# Patient Record
Sex: FEMALE | Race: WHITE | Employment: FULL TIME | URBAN - METROPOLITAN AREA
[De-identification: names, ages, dates, MRNs, and addresses within clinical notes are randomized per-mention and may not be internally consistent; named-entity substitution may affect disease eponyms.]

---

## 2023-06-12 ENCOUNTER — TELEPHONE (OUTPATIENT)
Dept: PRIMARY CARE | Facility: CLINIC | Age: 63
End: 2023-06-12

## 2023-07-14 DIAGNOSIS — Z12.11 ENCOUNTER FOR SCREENING COLONOSCOPY: Primary | ICD-10-CM

## 2023-08-09 ENCOUNTER — OFFICE VISIT (OUTPATIENT)
Dept: PRIMARY CARE | Facility: CLINIC | Age: 63
End: 2023-08-09
Payer: COMMERCIAL

## 2023-08-09 VITALS
SYSTOLIC BLOOD PRESSURE: 104 MMHG | DIASTOLIC BLOOD PRESSURE: 70 MMHG | WEIGHT: 105.6 LBS | BODY MASS INDEX: 19.43 KG/M2 | HEIGHT: 62 IN | HEART RATE: 55 BPM

## 2023-08-09 DIAGNOSIS — Z00.00 ROUTINE GENERAL MEDICAL EXAMINATION AT A HEALTH CARE FACILITY: Primary | ICD-10-CM

## 2023-08-09 PROBLEM — B00.9 HSV-1 INFECTION: Status: RESOLVED | Noted: 2023-08-09 | Resolved: 2023-08-09

## 2023-08-09 PROBLEM — J32.9 CHRONIC SINUSITIS: Status: ACTIVE | Noted: 2023-08-09

## 2023-08-09 PROBLEM — J34.3 HYPERTROPHY OF BOTH INFERIOR NASAL TURBINATES: Status: ACTIVE | Noted: 2023-08-09

## 2023-08-09 PROBLEM — M81.0 POSTMENOPAUSAL OSTEOPOROSIS: Status: ACTIVE | Noted: 2023-08-09

## 2023-08-09 PROBLEM — J34.2 DEVIATED NASAL SEPTUM: Status: RESOLVED | Noted: 2023-08-09 | Resolved: 2023-08-09

## 2023-08-09 PROCEDURE — 1036F TOBACCO NON-USER: CPT | Performed by: INTERNAL MEDICINE

## 2023-08-09 PROCEDURE — 93000 ELECTROCARDIOGRAM COMPLETE: CPT | Performed by: INTERNAL MEDICINE

## 2023-08-09 PROCEDURE — 99396 PREV VISIT EST AGE 40-64: CPT | Performed by: INTERNAL MEDICINE

## 2023-08-09 RX ORDER — FLUTICASONE PROPIONATE 50 MCG
2 SPRAY, SUSPENSION (ML) NASAL DAILY
COMMUNITY
Start: 2019-10-06

## 2023-08-09 RX ORDER — VALACYCLOVIR HYDROCHLORIDE 1 G/1
1 TABLET, FILM COATED ORAL EVERY 12 HOURS
COMMUNITY
Start: 2018-04-19 | End: 2024-02-28 | Stop reason: SDUPTHER

## 2023-08-09 RX ORDER — IBANDRONATE SODIUM 150 MG/1
TABLET, FILM COATED ORAL
COMMUNITY

## 2023-08-09 ASSESSMENT — ENCOUNTER SYMPTOMS
FREQUENCY: 0
ADENOPATHY: 0
LIGHT-HEADEDNESS: 0
NECK PAIN: 0
PALPITATIONS: 0
DYSPHORIC MOOD: 0
EYE ITCHING: 0
CONSTIPATION: 0
SORE THROAT: 0
WEAKNESS: 0
DIARRHEA: 0
DIZZINESS: 0
WHEEZING: 0
SINUS PAIN: 0
HYPERACTIVE: 0
NECK STIFFNESS: 0
NUMBNESS: 0
FATIGUE: 0
UNEXPECTED WEIGHT CHANGE: 0
BACK PAIN: 0
HEMATURIA: 0
TROUBLE SWALLOWING: 0
HEADACHES: 0
ARTHRALGIAS: 0
DECREASED CONCENTRATION: 0
SLEEP DISTURBANCE: 0
RHINORRHEA: 0
COUGH: 0
ABDOMINAL PAIN: 0
MYALGIAS: 0
SHORTNESS OF BREATH: 0
ACTIVITY CHANGE: 0
COLOR CHANGE: 0
FEVER: 0
FLANK PAIN: 0
CHEST TIGHTNESS: 0
BRUISES/BLEEDS EASILY: 0
ABDOMINAL DISTENTION: 0
NAUSEA: 0
NERVOUS/ANXIOUS: 0
DYSURIA: 0
VOMITING: 0
SINUS PRESSURE: 0

## 2023-08-09 NOTE — PROGRESS NOTES
Fiona Delgado comes in today for a comprehensive physical exam.      Ms. Delgado comes in today for comprehensive physical exam.  She is feeling quite well overall.  She denies any specific concerns of headaches, dizziness, chest pain or palpitations.  She does have seasonal allergies and finds that Flonase/fluticasone works quite well for her.  She is having some dental procedures done, so currently has stopped her Boniva treatment.  She believes that she is coming up on 5 years of therapy, having started this through her gynecologist in November 2018.  Her last bone density last year looked quite reassuring with significant improvement in osteopenia.  She is scheduled for a colonoscopy upcoming.  She follows closely with her gynecologist.  She denies any changes in her breathing, nausea, vomiting, nor changes in bowel or bladder habits.  She continues to run for exercise, essentially daily, and has had no joint injuries thankfully.  She follows with a dermatologist regularly.  She really tries to watch a healthy diet and keep up with routine healthcare maintenance.        Review of Systems   Constitutional:  Negative for activity change, fatigue, fever and unexpected weight change.   HENT:  Negative for congestion, ear pain, postnasal drip, rhinorrhea, sinus pressure, sinus pain, sore throat and trouble swallowing.    Eyes:  Negative for itching and visual disturbance.   Respiratory:  Negative for cough, chest tightness, shortness of breath and wheezing.    Cardiovascular:  Negative for chest pain, palpitations and leg swelling.   Gastrointestinal:  Negative for abdominal distention, abdominal pain, constipation, diarrhea, nausea and vomiting.   Endocrine: Negative for cold intolerance and polyuria.   Genitourinary:  Negative for dysuria, flank pain, frequency, hematuria, urgency, vaginal bleeding and vaginal discharge.   Musculoskeletal:  Negative for arthralgias, back pain, myalgias, neck pain and neck  stiffness.   Skin:  Negative for color change, pallor and rash.   Allergic/Immunologic: Negative for environmental allergies, food allergies and immunocompromised state.   Neurological:  Negative for dizziness, syncope, weakness, light-headedness, numbness and headaches.   Hematological:  Negative for adenopathy. Does not bruise/bleed easily.   Psychiatric/Behavioral:  Negative for behavioral problems, decreased concentration, dysphoric mood and sleep disturbance. The patient is not nervous/anxious and is not hyperactive.        Objective   Physical Exam  Constitutional:       General: She is not in acute distress.     Appearance: Normal appearance. She is well-developed. She is not ill-appearing.   HENT:      Head: Normocephalic.      Right Ear: Tympanic membrane, ear canal and external ear normal.      Left Ear: Tympanic membrane, ear canal and external ear normal.      Nose: Nose normal.      Mouth/Throat:      Mouth: Mucous membranes are moist.      Pharynx: Oropharynx is clear. No oropharyngeal exudate or posterior oropharyngeal erythema.   Eyes:      General: Lids are normal. No scleral icterus.     Extraocular Movements: Extraocular movements intact.      Conjunctiva/sclera: Conjunctivae normal.      Pupils: Pupils are equal, round, and reactive to light.   Neck:      Vascular: No carotid bruit.   Cardiovascular:      Rate and Rhythm: Normal rate and regular rhythm.      Pulses: Normal pulses.      Heart sounds: No murmur heard.  Pulmonary:      Effort: Pulmonary effort is normal. No respiratory distress.      Breath sounds: No wheezing, rhonchi or rales.   Chest:      Comments: Deferred to gynecology  Abdominal:      General: Bowel sounds are normal. There is no distension.      Palpations: Abdomen is soft. There is no mass.      Tenderness: There is no abdominal tenderness. There is no guarding.   Genitourinary:     Comments: Deferred to gynecology  Musculoskeletal:      Cervical back: Normal range of  motion and neck supple. No tenderness.      Right lower leg: No edema.      Left lower leg: No edema.   Lymphadenopathy:      Cervical: No cervical adenopathy.   Skin:     General: Skin is warm and dry.      Coloration: Skin is not pale.      Findings: No bruising or rash.   Neurological:      General: No focal deficit present.      Mental Status: She is alert and oriented to person, place, and time.      Cranial Nerves: No cranial nerve deficit.      Motor: No weakness.      Gait: Gait normal.   Psychiatric:         Mood and Affect: Mood normal.         Behavior: Behavior normal.         Judgment: Judgment normal.         Assessment/Plan   Full age-appropriate comprehensive physical exam and health care maintenance performed and discussed today.  Routine safety and preventative measures discussed including self breast exam, seatbelt use, no drinking and driving, no texting and driving, abstinence or cessation of tobacco use, routine dental and vision exams, healthy diet and regular exercise.    We will update comprehensive labs and follow-up on results once available.  EKG as above.  Colonoscopy scheduled for September.  Mammogram recently updated through gynecology, continue annual screening.  DEXA up-to-date from 2022 with much improved osteopenia.  Plan on repeat next year.  Has completed shingles vaccine series.  Tetanus up-to-date from approximately 2017.  Keeps up with COVID boosters and annual flu shots.    She is temporarily off of her Boniva therapy and I think it reasonable for her to remain off of this as she is nearing her 5-year jose.  We will again plan on repeating a DEXA next year and if any worsening, we could consider additional options for treatment.  We are happy to see her back annually.  If she has any questions, she is more than welcome to contact us.  Problem List Items Addressed This Visit    None

## 2023-08-09 NOTE — PATIENT INSTRUCTIONS
As always, it was a pleasure seeing you in the office today.  We will follow up on all comprehensive blood work once results are available and make any recommendations based on these results as may be indicated.  Please continue with routine gynecologic exams and annual mammograms.  We will watch for reports of your upcoming colonoscopy.  We will plan on repeating your bone density scan next year.  I would recommend taking a drug holiday off of your Boniva as you are nearing a 5-year completion jose.  Thank you for keeping up with routine vaccines.  If you have any questions, please feel free to contact us.  Otherwise, if all remains well, we are happy to see you back annually with your wellness visits.

## 2023-09-19 PROBLEM — M95.0 NASAL VALVE COLLAPSE: Status: ACTIVE | Noted: 2023-09-19

## 2023-09-19 PROBLEM — J34.829 NASAL VALVE COLLAPSE: Status: ACTIVE | Noted: 2023-09-19

## 2023-09-20 ENCOUNTER — HOSPITAL ENCOUNTER (OUTPATIENT)
Dept: DATA CONVERSION | Facility: HOSPITAL | Age: 63
End: 2023-09-20
Attending: STUDENT IN AN ORGANIZED HEALTH CARE EDUCATION/TRAINING PROGRAM | Admitting: STUDENT IN AN ORGANIZED HEALTH CARE EDUCATION/TRAINING PROGRAM
Payer: COMMERCIAL

## 2023-09-20 DIAGNOSIS — Z12.11 ENCOUNTER FOR SCREENING FOR MALIGNANT NEOPLASM OF COLON: ICD-10-CM

## 2023-09-20 DIAGNOSIS — K56.2 VOLVULUS (MULTI): ICD-10-CM

## 2023-10-19 ENCOUNTER — OFFICE VISIT (OUTPATIENT)
Dept: UROLOGY | Facility: CLINIC | Age: 63
End: 2023-10-19
Payer: COMMERCIAL

## 2023-10-19 DIAGNOSIS — Z12.31 OTHER SCREENING MAMMOGRAM: ICD-10-CM

## 2023-10-19 DIAGNOSIS — Z01.419 ENCOUNTER FOR ANNUAL ROUTINE GYNECOLOGICAL EXAMINATION: Primary | ICD-10-CM

## 2023-10-19 DIAGNOSIS — N39.0 LOWER URINARY TRACT INFECTIOUS DISEASE: ICD-10-CM

## 2023-10-19 LAB
POC APPEARANCE, URINE: CLEAR
POC BILIRUBIN, URINE: NEGATIVE
POC BLOOD, URINE: NEGATIVE
POC COLOR, URINE: YELLOW
POC GLUCOSE, URINE: NEGATIVE MG/DL
POC KETONES, URINE: NEGATIVE MG/DL
POC LEUKOCYTES, URINE: NEGATIVE
POC NITRITE,URINE: NEGATIVE
POC PH, URINE: 5 PH
POC PROTEIN, URINE: NEGATIVE MG/DL
POC SPECIFIC GRAVITY, URINE: 1.02
POC UROBILINOGEN, URINE: 0.2 EU/DL

## 2023-10-19 PROCEDURE — 99214 OFFICE O/P EST MOD 30 MIN: CPT | Performed by: OBSTETRICS & GYNECOLOGY

## 2023-10-19 PROCEDURE — 1036F TOBACCO NON-USER: CPT | Performed by: OBSTETRICS & GYNECOLOGY

## 2023-10-19 PROCEDURE — 81003 URINALYSIS AUTO W/O SCOPE: CPT | Performed by: OBSTETRICS & GYNECOLOGY

## 2023-10-19 NOTE — PROGRESS NOTES
Chief Complaint     NPV  Annual exam and pap      History of Present Illness  62-year-old G3, P2 here to establish GYN care.  She is still running and lifting, staying healthy.     She had a dental issue and her PCP told her to stop the alendendronate. Her PCP is managing that.     PMH: Osteoporosis  PSH: None  Gyn Hx:  x2  FamHx: None  Soc: Non-smoker, CPA        Review of Systems     Constitutional: no fever, no chills and not feeling poorly.   Eyes: no eyesight problems.   ENT: no hearing loss, no nosebleeds and no sore throat.   Cardiovascular: no chest pain, no palpitations and no lower extremity edema.   Respiratory: no shortness of breath, no wheezing, no cough and no shortness of breath during exertion.   Gastrointestinal: no abdominal pain, no constipation, no heartburn, no diarrhea, no vomiting and no blood in stools.   Genitourinary: no dysuria, no incontinence, normal micturition, no pelvic pain and no vaginal discharge.   Musculoskeletal: no arthralgias and no gait abnormality.   Integumentary: no skin lesions, no change in a mole and no skin wound.   Neurological: no confusion, no dizziness, no fainting and no difficulty walking.   Psychiatric: not suicidal, no anxiety and no depression.   All other systems have been reviewed and are negative for complaint.      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)   · Hypertrophy of both inferior nasal turbinates (478.0) (J34.3)   · Nasal valve collapse (478.19) (J34.89)   · Need for shingles vaccine (V04.89) (Z23)   · Postmenopausal osteoporosis (733.01) (M81.0)     Past Medical History  Problems    · Parity 2 (V61.5) (Z87.898)     Family History  Father    · Family history of coronary artery disease (V17.3) (Z82.49)  Brother    · Family history of coronary artery disease (V17.3) (Z82.49)     Social  History  Problems    · Never a smoker   · Social alcohol use (Z78.9)     Allergies  Medication    · No Known Drug Allergies   Recorded By: Lexis Gonzalez; 4/19/2018 8:51:53 AM     Current Meds     Medication Name Instruction   Fluticasone Propionate 50 MCG/ACT Nasal Suspension USE 2 SPRAYS IN EACH NOSTRIL ONCE DAILY   Ibandronate Sodium 150 MG Oral Tablet TAKE 1 TABLET BY MOUTH  MONTHLY ON THE SAME DATE  TAKE WITH A FULL GLASS OF  WATER, REMAIN UPRIGHT FOR  AT LEAST 1 HOUR   valACYclovir HCl - 1 GM Oral Tablet TAKE 2 TABLET Every twelve hours PRN For one day      Vitals  Vital Signs     Recorded: [id number] 08:55AM   Temperature 97 F   Heart Rate 67   Systolic 106   Diastolic 69   Height 5 ft 1.5 in   Weight 104 lb    BMI Calculated 19.33 kg/m2   BSA Calculated 1.44   Tobacco Use b) No         External female genitalia is consistent with genitourinary changes of menopause     Tissues are thin and atrophic   Speculum exam done gently and cervix visualized, no friable tissue, cysts/lesions noted   Bimanual exam done and uterus is small and adnexa are nontender and without masses appreciated       F/up in one year  Mammogram req    Scribe Attestation  By signing my name below, I,  , Scribe   attest that this documentation has been prepared under the direction and in the presence of Madalyn Coyle MD MPH.

## 2023-12-06 ENCOUNTER — APPOINTMENT (OUTPATIENT)
Dept: PRIMARY CARE | Facility: CLINIC | Age: 63
End: 2023-12-06
Payer: COMMERCIAL

## 2023-12-20 ENCOUNTER — HOSPITAL ENCOUNTER (OUTPATIENT)
Dept: RADIOLOGY | Facility: HOSPITAL | Age: 63
Discharge: HOME | End: 2023-12-20
Payer: COMMERCIAL

## 2023-12-20 DIAGNOSIS — Z12.31 OTHER SCREENING MAMMOGRAM: ICD-10-CM

## 2024-02-28 DIAGNOSIS — B00.9 HSV-1 INFECTION: Primary | ICD-10-CM

## 2024-02-28 RX ORDER — VALACYCLOVIR HYDROCHLORIDE 1 G/1
TABLET, FILM COATED ORAL
Qty: 20 TABLET | Refills: 1 | Status: SHIPPED | OUTPATIENT
Start: 2024-02-28

## 2024-06-19 ENCOUNTER — HOSPITAL ENCOUNTER (OUTPATIENT)
Dept: RADIOLOGY | Facility: CLINIC | Age: 64
Discharge: HOME | End: 2024-06-19
Payer: COMMERCIAL

## 2024-06-19 VITALS — HEIGHT: 63 IN | BODY MASS INDEX: 17.72 KG/M2 | WEIGHT: 100 LBS

## 2024-06-19 PROCEDURE — 77067 SCR MAMMO BI INCL CAD: CPT

## 2024-06-19 PROCEDURE — 77067 SCR MAMMO BI INCL CAD: CPT | Performed by: RADIOLOGY

## 2024-06-19 PROCEDURE — 77063 BREAST TOMOSYNTHESIS BI: CPT | Performed by: RADIOLOGY

## 2024-09-20 ENCOUNTER — APPOINTMENT (OUTPATIENT)
Dept: PRIMARY CARE | Facility: CLINIC | Age: 64
End: 2024-09-20
Payer: COMMERCIAL

## 2024-09-20 VITALS
WEIGHT: 103 LBS | BODY MASS INDEX: 18.25 KG/M2 | DIASTOLIC BLOOD PRESSURE: 74 MMHG | HEART RATE: 56 BPM | SYSTOLIC BLOOD PRESSURE: 135 MMHG | HEIGHT: 63 IN

## 2024-09-20 DIAGNOSIS — Z23 NEED FOR INFLUENZA VACCINATION: ICD-10-CM

## 2024-09-20 DIAGNOSIS — H91.92 HEARING LOSS OF LEFT EAR, UNSPECIFIED HEARING LOSS TYPE: ICD-10-CM

## 2024-09-20 DIAGNOSIS — Z78.0 POSTMENOPAUSAL ESTROGEN DEFICIENCY: ICD-10-CM

## 2024-09-20 DIAGNOSIS — Z00.00 ROUTINE GENERAL MEDICAL EXAMINATION AT A HEALTH CARE FACILITY: Primary | ICD-10-CM

## 2024-09-20 PROCEDURE — 99396 PREV VISIT EST AGE 40-64: CPT | Performed by: INTERNAL MEDICINE

## 2024-09-20 PROCEDURE — 90471 IMMUNIZATION ADMIN: CPT | Performed by: INTERNAL MEDICINE

## 2024-09-20 PROCEDURE — 93000 ELECTROCARDIOGRAM COMPLETE: CPT | Performed by: INTERNAL MEDICINE

## 2024-09-20 PROCEDURE — 90656 IIV3 VACC NO PRSV 0.5 ML IM: CPT | Performed by: INTERNAL MEDICINE

## 2024-09-20 PROCEDURE — 3008F BODY MASS INDEX DOCD: CPT | Performed by: INTERNAL MEDICINE

## 2024-09-20 PROCEDURE — 1036F TOBACCO NON-USER: CPT | Performed by: INTERNAL MEDICINE

## 2024-09-20 ASSESSMENT — ENCOUNTER SYMPTOMS
EYE DISCHARGE: 0
FREQUENCY: 0
COLOR CHANGE: 0
BRUISES/BLEEDS EASILY: 0
FATIGUE: 0
EYE ITCHING: 0
DECREASED CONCENTRATION: 0
SINUS PAIN: 0
ARTHRALGIAS: 0
DYSURIA: 0
CHEST TIGHTNESS: 0
SINUS PRESSURE: 0
VOMITING: 0
ABDOMINAL PAIN: 0
WEAKNESS: 0
CONSTIPATION: 0
NECK PAIN: 0
NERVOUS/ANXIOUS: 0
HEMATURIA: 0
ACTIVITY CHANGE: 0
NECK STIFFNESS: 0
FEVER: 0
SHORTNESS OF BREATH: 0
HYPERACTIVE: 0
RHINORRHEA: 0
NAUSEA: 0
NUMBNESS: 0
WHEEZING: 0
CHILLS: 0
DIZZINESS: 0
ABDOMINAL DISTENTION: 0
DIARRHEA: 0
MYALGIAS: 0
SORE THROAT: 0
DYSPHORIC MOOD: 0
PALPITATIONS: 0
ADENOPATHY: 0
VOICE CHANGE: 0
SLEEP DISTURBANCE: 0
LIGHT-HEADEDNESS: 0
HEADACHES: 0
COUGH: 0
BACK PAIN: 0

## 2024-09-20 NOTE — PROGRESS NOTES
Fiona Delgado comes in today for a comprehensive physical exam.      Ms. Delgado comes in today for comprehensive physical exam.  She feels quite well overall.  She has noticed some decreased hearing in her left ear.  She is concerned about this and wanted to have this evaluated but also referral to an audiologist.  She plans update her bone density scan this year.  She denies any problems of headaches, dizziness, chest pain or palpitations, shortness of breath nor cough, nausea, vomiting, nor changes in bowel nor bladder habits.  She follows with her gynecologist regularly, keeps up with routine vaccines.  She is comfortable having updated comprehensive lab studies but needs to return when she is fasting.  She would like her flu shot today.        Review of Systems   Constitutional:  Negative for activity change, chills, fatigue and fever.   HENT:  Positive for hearing loss. Negative for congestion, ear pain, postnasal drip, rhinorrhea, sinus pressure, sinus pain, sneezing, sore throat, tinnitus and voice change.    Eyes:  Negative for discharge, itching and visual disturbance.   Respiratory:  Negative for cough, chest tightness, shortness of breath and wheezing.    Cardiovascular:  Negative for chest pain, palpitations and leg swelling.   Gastrointestinal:  Negative for abdominal distention, abdominal pain, constipation, diarrhea, nausea and vomiting.   Endocrine: Negative for cold intolerance, heat intolerance and polyuria.   Genitourinary:  Negative for dysuria, frequency, hematuria, urgency, vaginal bleeding and vaginal discharge.   Musculoskeletal:  Negative for arthralgias, back pain, gait problem, myalgias, neck pain and neck stiffness.   Skin:  Negative for color change, pallor and rash.   Allergic/Immunologic: Negative for environmental allergies, food allergies and immunocompromised state.   Neurological:  Negative for dizziness, syncope, weakness, light-headedness, numbness and headaches.    Hematological:  Negative for adenopathy. Does not bruise/bleed easily.   Psychiatric/Behavioral:  Negative for behavioral problems, decreased concentration, dysphoric mood and sleep disturbance. The patient is not nervous/anxious and is not hyperactive.        Objective   Physical Exam  Constitutional:       General: She is not in acute distress.     Appearance: Normal appearance. She is well-developed. She is not ill-appearing.   HENT:      Head: Normocephalic.      Right Ear: Tympanic membrane, ear canal and external ear normal.      Left Ear: Tympanic membrane, ear canal and external ear normal.      Nose: Nose normal. No congestion.      Mouth/Throat:      Mouth: Mucous membranes are moist.      Pharynx: Oropharynx is clear. No oropharyngeal exudate or posterior oropharyngeal erythema.   Eyes:      General: Lids are normal. No scleral icterus.     Extraocular Movements: Extraocular movements intact.      Conjunctiva/sclera: Conjunctivae normal.      Pupils: Pupils are equal, round, and reactive to light.   Neck:      Vascular: No carotid bruit.   Cardiovascular:      Rate and Rhythm: Normal rate and regular rhythm.      Pulses: Normal pulses.      Heart sounds: No murmur heard.     No gallop.   Pulmonary:      Effort: Pulmonary effort is normal. No respiratory distress.      Breath sounds: No wheezing, rhonchi or rales.   Chest:   Breasts:     Right: No mass.      Left: No mass.      Comments: Fibrocystic breasts, no dominant masses  Abdominal:      General: Bowel sounds are normal. There is no distension.      Palpations: Abdomen is soft. There is no mass.      Tenderness: There is no abdominal tenderness. There is no guarding or rebound.   Genitourinary:     Comments: Deferred to gynecology  Musculoskeletal:         General: No swelling or signs of injury.      Cervical back: Normal range of motion and neck supple. No tenderness.      Right lower leg: No edema.      Left lower leg: No edema.    Lymphadenopathy:      Cervical: No cervical adenopathy.      Upper Body:      Right upper body: No supraclavicular or axillary adenopathy.      Left upper body: No supraclavicular or axillary adenopathy.   Skin:     General: Skin is warm and dry.      Coloration: Skin is not pale.      Findings: No bruising or rash.   Neurological:      General: No focal deficit present.      Mental Status: She is alert and oriented to person, place, and time.      Cranial Nerves: No cranial nerve deficit.      Motor: No weakness.      Gait: Gait normal.   Psychiatric:         Mood and Affect: Mood normal.         Behavior: Behavior normal.         Judgment: Judgment normal.         Assessment/Plan   Full age-appropriate comprehensive physical exam and health care maintenance performed and discussed today.  Routine safety and preventative measures discussed including self breast exam, seatbelt use, no drinking and driving, no texting and driving, abstinence or cessation of tobacco use, routine dental and vision exams, healthy diet and regular exercise.    We will update comprehensive labs and follow-up on results once available.  She will return when fasting.  Bone density due this year, order placed.  Mammogram up-to-date from June, continue annual screening.  Colonoscopy up-to-date from 2023, recommend repeat 5 years.  EKG as above.  Tetanus up-to-date from 2017.  Has completed shingles vaccine series.  Flu shot provided today.  Have counseled regarding updated COVID boosters and RSV vaccine.    Referral placed for audiology.  Happy to see her back annually.  She is to call with any questions.    Problem List Items Addressed This Visit    None  Visit Diagnoses       Need for influenza vaccination        Relevant Orders    Flu vaccine, trivalent, preservative free, age 6 months and greater (Fluarix/Fluzone/Flulaval) (Completed)    Routine general medical examination at a health care facility        Relevant Orders    ECG 12 lead  (Clinic Performed)

## 2024-09-20 NOTE — PATIENT INSTRUCTIONS
It was a pleasure seeing you in the office today.  At a time that is convenient for you, please obtain your bloodwork on a fasting basis.  We will then follow up on these results once available.  Please continue with routine gynecologic exams and annual mammograms.  Please consider scheduling your bone density scan this year as well.  Colonoscopy remains up-to-date from 2023, recommended to be repeated in 5 years total.  We will consider updating your tetanus vaccine next year.  You could also consider an RSV vaccine and an updated COVID booster.  Thank you for updating your flu shot today.  If you have any questions, please feel free to contact us.  Otherwise, we are happy to see you back annually for your wellness visits.

## 2024-12-10 DIAGNOSIS — B00.9 HSV-1 INFECTION: Primary | ICD-10-CM

## 2024-12-10 RX ORDER — VALACYCLOVIR HYDROCHLORIDE 1 G/1
TABLET, FILM COATED ORAL
Qty: 20 TABLET | Refills: 1 | Status: SHIPPED | OUTPATIENT
Start: 2024-12-10

## 2024-12-23 ENCOUNTER — HOSPITAL ENCOUNTER (OUTPATIENT)
Dept: RADIOLOGY | Facility: CLINIC | Age: 64
Discharge: HOME | End: 2024-12-23
Payer: COMMERCIAL

## 2024-12-23 DIAGNOSIS — Z78.0 POSTMENOPAUSAL ESTROGEN DEFICIENCY: ICD-10-CM

## 2024-12-23 PROCEDURE — 77080 DXA BONE DENSITY AXIAL: CPT | Performed by: RADIOLOGY

## 2024-12-23 PROCEDURE — 77080 DXA BONE DENSITY AXIAL: CPT

## 2024-12-27 ENCOUNTER — TELEPHONE (OUTPATIENT)
Dept: PRIMARY CARE | Facility: CLINIC | Age: 64
End: 2024-12-27

## 2024-12-27 ENCOUNTER — OFFICE VISIT (OUTPATIENT)
Dept: URGENT CARE | Age: 64
End: 2024-12-27
Payer: COMMERCIAL

## 2024-12-27 VITALS
SYSTOLIC BLOOD PRESSURE: 95 MMHG | OXYGEN SATURATION: 98 % | WEIGHT: 103 LBS | DIASTOLIC BLOOD PRESSURE: 65 MMHG | TEMPERATURE: 97.7 F | RESPIRATION RATE: 17 BRPM | BODY MASS INDEX: 18.25 KG/M2 | HEART RATE: 61 BPM

## 2024-12-27 DIAGNOSIS — H00.011 HORDEOLUM EXTERNUM OF RIGHT UPPER EYELID: Primary | ICD-10-CM

## 2024-12-27 RX ORDER — CEPHALEXIN 500 MG/1
500 CAPSULE ORAL 2 TIMES DAILY
Qty: 14 CAPSULE | Refills: 0 | Status: SHIPPED | OUTPATIENT
Start: 2024-12-27 | End: 2025-01-03

## 2024-12-27 RX ORDER — ERYTHROMYCIN 5 MG/G
OINTMENT OPHTHALMIC EVERY 6 HOURS
Qty: 3.5 G | Refills: 0 | Status: SHIPPED | OUTPATIENT
Start: 2024-12-27 | End: 2025-01-06

## 2024-12-27 ASSESSMENT — PAIN SCALES - GENERAL: PAINLEVEL_OUTOF10: 3

## 2024-12-27 ASSESSMENT — PATIENT HEALTH QUESTIONNAIRE - PHQ9
SUM OF ALL RESPONSES TO PHQ9 QUESTIONS 1 AND 2: 0
2. FEELING DOWN, DEPRESSED OR HOPELESS: NOT AT ALL
1. LITTLE INTEREST OR PLEASURE IN DOING THINGS: NOT AT ALL

## 2024-12-27 NOTE — PROGRESS NOTES
HPI:  Patient states that she has swelling on her right upper eyelid for the past 4 days that has been increasing in size.  Some pain to touch.  No fever/chills.  No drainage from the area.  No blurry vision.  No HA and no dizziness.  No eye injury.  Pt was doing warm compress,  but not consistently.  Pt has appt with her eye doctor in 3 days.       ROS:  No blurry vision  No eye drainage  No eye injury    PE:    A&O x3  NCAT  PERRLA, EOMI, +erythema/swelling on right upper eyelid laterally w/o fluctuance or discharge; +tndop  Normal respiratory effort  MOEx4  No focal deficit  Judgement normal    A/P:   Stye right eyelid    Warm compress to the area 10-15 minutes a few times per day.  Take Tylenol as needed for pain.   Avoid rubbing eyes.  Keep a diary of symptoms.  Recheck with eye doctor in 3 days as scheduled.  Add oral antibiotic in 2 days if needed.  Eat yogurt and take probiotics when on medication.  Keep a diary of symptoms.  Go to the ER if starts getting worse.

## 2025-01-01 DIAGNOSIS — M81.0 POSTMENOPAUSAL OSTEOPOROSIS: Primary | ICD-10-CM

## 2025-01-01 RX ORDER — IBANDRONATE SODIUM 150 MG/1
150 TABLET, FILM COATED ORAL
Qty: 3 TABLET | Refills: 3 | Status: SHIPPED | OUTPATIENT
Start: 2025-01-01 | End: 2026-01-01

## 2025-01-23 ENCOUNTER — APPOINTMENT (OUTPATIENT)
Dept: UROLOGY | Facility: CLINIC | Age: 65
End: 2025-01-23
Payer: COMMERCIAL

## 2025-02-26 ENCOUNTER — CLINICAL SUPPORT (OUTPATIENT)
Dept: AUDIOLOGY | Facility: CLINIC | Age: 65
End: 2025-02-26
Payer: COMMERCIAL

## 2025-02-26 DIAGNOSIS — H90.3 SENSORINEURAL HEARING LOSS, BILATERAL: Primary | ICD-10-CM

## 2025-02-26 DIAGNOSIS — H91.92 HEARING LOSS OF LEFT EAR, UNSPECIFIED HEARING LOSS TYPE: ICD-10-CM

## 2025-02-26 PROCEDURE — 92567 TYMPANOMETRY: CPT

## 2025-02-26 PROCEDURE — 92557 COMPREHENSIVE HEARING TEST: CPT

## 2025-02-26 ASSESSMENT — PAIN SCALES - GENERAL: PAINLEVEL_OUTOF10: 0 - NO PAIN

## 2025-02-26 ASSESSMENT — PAIN - FUNCTIONAL ASSESSMENT: PAIN_FUNCTIONAL_ASSESSMENT: 0-10

## 2025-02-26 NOTE — PROGRESS NOTES
ADULT AUDIOLOGY AUDIOMETRIC EVALUATION    Name:  Fiona Delgado  :  1960  Age:  64 y.o.  Date of Evaluation:  2025    HISTORY  Fiona Delgado is seen today at the request of Lexis Gonzalez MD for an evaluation of hearing. Patient arrives with the complaint of left worse than right hearing and left pressure. Patient denies ear pain, ear drainage, ear infections, ear surgeries, tinnitus, noise exposure, family history of hearing loss and dizziness/vertigo.      AUDIOLOGIC EVALUATION    OTOSCOPY  Otoscopic inspection revealed clear canals and visualization of the eardrum bilaterally.    IMMITTANCE  Normal tympanograms were obtained bilaterally, consistent with a normal moving eardrums and the likely absence of fluid.    Ipsilateral acoustic reflexes were attempted but a seal could not be maintained.    AUDIOMETRIC TESTING  Pure tone audiometry conducted via insert headphones from 125 Hz - 8000 Hz with good reliability was consistent with normal hearing from 125 Hz - 1000 Hz sloping to a moderately severe sensorineural hearing loss bilaterally.     SPEECH RECOGNITION TESTING (SRT)  SRT was in agreement with pure tone averages bilaterally ( Right: 20 dB HL, Left: 15 dB HL).    WORD RECOGNITION SCORE (WRS)  WRS was excellent (96%) in the right ear and excellent (96%) in the left ear using recorded ordered by difficulty NU6 word list.    SENTENCE IN NOISE (SIN)  Binaural QuickSIN testing indicated a mild SNR loss.     IMPRESSIONS:  The results of today's assessment after consistent with:  -Normal tympanograms  -Normal sloping to moderately-severe sensorienrual hearing loss bilaterally.     The patient was counseled with regard to the findings. She is a hearing aid candidate. She is unsure if she is interested at this time. I recommended a hearing aid trial, informed her of out of pocket charge, and provided her with the cards of our hearing aid audiologist as well as [place] devices. She will  call if interested.     RECOMMENDATIONS:  Treatment Plan:.  Follow up with ENT/PCP as recommended.  Annual hearing assessments as recommended. Follow up sooner if patient feels hearing or symptoms have changed.  Consider hearing aid trial pending patient motivation   Contact the clinic with questions or concerns at [phone].     Time Stamp: 40 minutes     Angelique Toussaint, [facility]  Licensed Audiologist

## 2025-03-24 DIAGNOSIS — M81.0 POSTMENOPAUSAL OSTEOPOROSIS: ICD-10-CM

## 2025-03-24 RX ORDER — IBANDRONATE SODIUM 150 MG/1
150 TABLET, FILM COATED ORAL
Qty: 3 TABLET | Refills: 3 | Status: SHIPPED | OUTPATIENT
Start: 2025-03-24 | End: 2026-03-24

## 2025-03-28 NOTE — PROGRESS NOTES
FOLLOW-UP VISIT       HISTORY OF PRESENT ILLNESS:   Fiona Delgado is a 64 y.o. female who presents to me today for her annual well woman exam.     The patient reports she was restarted on Boniva 150 mg tablets for osteoporosis by her PCP. Reports bad stomach pains that can last for 4-5 days with the medication. Has not tried the Prolia injection. Continues to be active with strength training and running multiple times a week.     Regarding GYN care, the patient denies any GYN concerns, vaginal dryness, and sexual health concerns at this time.     Patient's last pap was in 2021 which demonstrated no evidence of lesion or malignancy.     Patient's last mammogram was on 6/19/24 which demonstrated no evidence of malignancy.     PAST MEDICAL HISTORY:  Past Medical History:   Diagnosis Date    Deviated nasal septum 08/09/2023    Parity 2        PAST SURGICAL HISTORY:  Past Surgical History:   Procedure Laterality Date    NO PAST SURGERIES          ALLERGIES:   No Known Allergies     MEDICATIONS:   Medication Documentation Review Audit       Reviewed by Chicho Giron on 04/17/25 at 0813      Medication Order Taking? Sig Documenting Provider Last Dose Status   fluticasone (Flonase) 50 mcg/actuation nasal spray [id number]  Administer 2 sprays into each nostril once daily. Historical Provider, MD  Active   ibandronate (Boniva) 150 mg tablet [id number]  Take 1 tablet (150 mg) by mouth every 30 (thirty) days. Take in morning with full glass of water on an empty stomach. No food, drink, meds, or lying down for 60 minutes after. Lexis Gonzalez MD  Active   valACYclovir (Valtrex) 1 gram tablet [id number]  Take 2 tabs BID x 1 day as needed for outbreaks. Lexis Gonzalez MD  Active                     SOCIAL HISTORY:  Patient  reports that she has never smoked. She has never used smokeless tobacco.   Social History     Socioeconomic History    Marital status:      Spouse name: Not on file    Number of children: Not on file    Years  "of education: Not on file    Highest education level: Not on file   Occupational History    Not on file   Tobacco Use    Smoking status: Never    Smokeless tobacco: Never   Substance and Sexual Activity    Alcohol use: Not on file     Comment: social    Drug use: Not on file    Sexual activity: Not on file   Other Topics Concern    Not on file   Social History Narrative    Not on file     Social Drivers of Health     Financial Resource Strain: Not on file   Food Insecurity: Not on file   Transportation Needs: Not on file   Physical Activity: Not on file   Stress: Not on file   Social Connections: Not on file   Intimate Partner Violence: Not on file   Housing Stability: Not on file       FAMILY HISTORY:  Family History   Problem Relation Name Age of Onset    Coronary artery disease Father      Coronary artery disease Brother         REVIEW OF SYSTEMS:  Constitutional: Negative for fever and chills. Denies anorexia, weight loss.  Eyes: Negative for visual disturbance.   Respiratory: Negative for shortness of breath.    Cardiovascular: Negative for chest pain.   Gastrointestinal: Negative for nausea and vomiting.   Genitourinary: See interval history above.  Skin: Negative for rash.   Neurological: Negative for dizziness and numbness.   Psychiatric/Behavioral: Negative for confusion and decreased concentration.     PHYSICAL EXAM:  Temperature 36 °C (96.8 °F), height 1.6 m (5' 3\"), weight 47 kg (103 lb 9.6 oz).  :   External female genitalia is normal  There are no lesions on vulva, labia major or the labia minora   The clitoral prepuce is normal   The urethra is also normal   Speculum exam done gently and cervix visualized, no friable tissue, cysts/lesions noted   Bimanual exam done and uterus is small and adnexa are nontender and without masses appreciated      Constitutional: Patient appears well-developed and well-nourished. No distress.    Head: Normocephalic and atraumatic.    Neck: Normal range of motion.  "   Cardiovascular: Normal rate.    Pulmonary/Chest: Effort normal. No respiratory distress.   Musculoskeletal: Normal range of motion.    Neurological: Alert and oriented to person, place, and time.  Psychiatric: Normal mood and affect. Behavior is normal. Thought content normal.       Assessment:      1. Encounter for annual routine gynecological examination            Fiona Delgado is a 64 y.o. female presenting for annual exam.     The patient is doing well. We discussed the possibility of the patient switching to Prolia for her osteoporosis. Risks and expectations of the medication were discussed in great detail. The patient will discuss this further with her PCP.     We discussed that if the patient experiences vaginal dryness that we can start vaginal estrogen treatment. As the patient is not experiencing this at this time, we will hold off on treatment for now. Patient will reach out to me if she would like to start this in the future.      Plan:   Order mammogram.   Follow-up annually.     Madalny Coyle MD MPH      Scribe Attestation  By signing my name below, ICierra, Scribe   attest that this documentation has been prepared under the direction and in the presence of Madalyn Coyle MD MPH.

## 2025-04-17 ENCOUNTER — APPOINTMENT (OUTPATIENT)
Dept: UROLOGY | Facility: CLINIC | Age: 65
End: 2025-04-17
Payer: COMMERCIAL

## 2025-04-17 VITALS — TEMPERATURE: 96.8 F | WEIGHT: 103.6 LBS | BODY MASS INDEX: 18.36 KG/M2 | HEIGHT: 63 IN

## 2025-04-17 DIAGNOSIS — Z01.419 ENCOUNTER FOR ANNUAL ROUTINE GYNECOLOGICAL EXAMINATION: ICD-10-CM

## 2025-04-17 PROCEDURE — 99396 PREV VISIT EST AGE 40-64: CPT | Performed by: OBSTETRICS & GYNECOLOGY

## 2025-04-17 PROCEDURE — 1036F TOBACCO NON-USER: CPT | Performed by: OBSTETRICS & GYNECOLOGY

## 2025-04-17 PROCEDURE — 3008F BODY MASS INDEX DOCD: CPT | Performed by: OBSTETRICS & GYNECOLOGY

## 2025-04-17 ASSESSMENT — PAIN SCALES - GENERAL: PAINLEVEL_OUTOF10: 0-NO PAIN
